# Patient Record
Sex: FEMALE | URBAN - METROPOLITAN AREA
[De-identification: names, ages, dates, MRNs, and addresses within clinical notes are randomized per-mention and may not be internally consistent; named-entity substitution may affect disease eponyms.]

---

## 2017-04-18 ENCOUNTER — TRANSFERRED RECORDS (OUTPATIENT)
Dept: HEALTH INFORMATION MANAGEMENT | Facility: CLINIC | Age: 62
End: 2017-04-18

## 2017-09-27 ENCOUNTER — TRANSFERRED RECORDS (OUTPATIENT)
Dept: HEALTH INFORMATION MANAGEMENT | Facility: CLINIC | Age: 62
End: 2017-09-27

## 2017-09-28 ENCOUNTER — TRANSFERRED RECORDS (OUTPATIENT)
Dept: HEALTH INFORMATION MANAGEMENT | Facility: CLINIC | Age: 62
End: 2017-09-28

## 2017-10-06 ENCOUNTER — TRANSFERRED RECORDS (OUTPATIENT)
Dept: HEALTH INFORMATION MANAGEMENT | Facility: CLINIC | Age: 62
End: 2017-10-06

## 2017-10-13 ENCOUNTER — TRANSFERRED RECORDS (OUTPATIENT)
Dept: HEALTH INFORMATION MANAGEMENT | Facility: CLINIC | Age: 62
End: 2017-10-13

## 2017-10-16 LAB — COPATH REPORT: NORMAL

## 2017-10-16 PROCEDURE — 00000346 ZZHCL STATISTIC REVIEW OUTSIDE SLIDES TC 88321

## 2017-10-17 ENCOUNTER — OFFICE VISIT (OUTPATIENT)
Dept: TRANSPLANT | Facility: CLINIC | Age: 62
End: 2017-10-17
Payer: COMMERCIAL

## 2017-10-17 VITALS
SYSTOLIC BLOOD PRESSURE: 124 MMHG | HEART RATE: 66 BPM | TEMPERATURE: 98.1 F | RESPIRATION RATE: 18 BRPM | WEIGHT: 154.4 LBS | DIASTOLIC BLOOD PRESSURE: 76 MMHG | OXYGEN SATURATION: 97 %

## 2017-10-17 DIAGNOSIS — C81.18 NODULAR SCLEROSIS HODGKIN LYMPHOMA OF LYMPH NODES OF MULTIPLE REGIONS (H): Primary | ICD-10-CM

## 2017-10-17 PROCEDURE — 99213 OFFICE O/P EST LOW 20 MIN: CPT | Mod: ZF

## 2017-10-17 RX ORDER — ALBUTEROL SULFATE 90 UG/1
1-2 AEROSOL, METERED RESPIRATORY (INHALATION)
COMMUNITY
Start: 2017-09-14

## 2017-10-17 RX ORDER — EPINEPHRINE 0.3 MG/.3ML
0.3 INJECTION SUBCUTANEOUS
COMMUNITY
Start: 2017-09-14

## 2017-10-17 RX ORDER — DIPHENOXYLATE HYDROCHLORIDE AND ATROPINE SULFATE 2.5; .025 MG/1; MG/1
1 TABLET ORAL
COMMUNITY
Start: 2013-07-17

## 2017-10-17 RX ORDER — IBUPROFEN 200 MG
400 TABLET ORAL
COMMUNITY

## 2017-10-17 RX ORDER — MULTIVIT-MINERALS/FOLIC ACID 200 MCG
TABLET,CHEWABLE ORAL
COMMUNITY
Start: 2015-12-10

## 2017-10-17 RX ORDER — CHLORAL HYDRATE 500 MG
CAPSULE ORAL
COMMUNITY
Start: 2013-07-17

## 2017-10-17 ASSESSMENT — PAIN SCALES - GENERAL: PAINLEVEL: NO PAIN (0)

## 2017-10-17 NOTE — MR AVS SNAPSHOT
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  After hours call the   hospital at [phone] or   [phone]. Ask for the BMT   fellow on-call            Follow-ups after your visit        Who to contact     If you have questions or need follow up information about today's clinic visit or your schedule please contact [facility] BLOOD AND MARROW TRANSPLANT directly at [phone].  Normal or non-critical lab and imaging results will be communicated to you by [employer], letter or phone within 4 business days after the clinic has received the results. If you do not hear from us within 7 days, please contact the clinic through [employer] or phone. If you have a critical or abnormal lab result, we will notify you by phone as soon as possible.  Submit refill requests through [employer] or call your pharmacy and they will forward the refill request to us. Please allow 3 business days for your refill to be completed.          Additional Information About Your Visit        [employer] Information     [employer] lets you send messages to your doctor, view your test results, renew your prescriptions, schedule appointments and more. To sign up, go to [email or web address] . Click on \"Log in\" on the left side of the screen, which will take you to the Welcome page. Then click on \"Sign up Now\" on the " right side of the page.     You will be asked to enter the access code listed below, as well as some personal information. Please follow the directions to create your username and password.     Your access code is: [email or web address]  Expires: 2018  8:52 AM     Your access code will  in 90 days. If you need help or a new code, please call your [facility] clinic or [phone].        Care EveryWhere ID     This is your Care EveryWhere ID. This could be used by other organizations to access your [facility] medical records  [id number]        Your Vitals Were     Pulse Temperature Respirations Pulse Oximetry          66 98.1  F (36.7  C) (Oral) 18 97%         Blood Pressure from Last 3 Encounters:   10/17/17 124/76    Weight from Last 3 Encounters:   10/17/17 70 kg (154 lb 6.4 oz)              Today, you had the following     No orders found for display       Recent Review Flowsheet Data     There is no flowsheet data to display.               Primary Care Provider Office Phone # Fax #    Rosa Ortiz [phone]       DEANNE CORMIER [address]  [place] [address]        Equal Access to Services     KHANG HOWARD AH: Hadii aad ku hadasho Sochungali, waaxda luqadaha, qaybta kaalmada adeegyada, kenyon bull . So [facility] [phone].    ATENCIÓN: Si habla español, tiene a khan disposición servicios gratuitos de asistencia lingüística. [facility] [phone].    We comply with applicable federal civil rights laws and [place] laws. We do not discriminate on the basis of race, color, national origin, age, disability, sex, sexual orientation, or gender identity.            Thank you!     Thank you for choosing [facility] BLOOD AND MARROW TRANSPLANT  for your care. Our goal is always to provide you with excellent care. Hearing back from our patients is one way we can continue to improve our services. Please take a few minutes to complete the written survey that you may  receive in the mail after your visit with us. Thank you!             Your Updated Medication List - Protect others around you: Learn how to safely use, store and throw away your medicines at www.disposemymeds.org.          This list is accurate as of: 10/17/17 11:59 PM.  Always use your most recent med list.                   Brand Name Dispense Instructions for use Diagnosis    albuterol 108 (90 BASE) MCG/ACT Inhaler    PROAIR HFA/PROVENTIL HFA/VENTOLIN HFA     Inhale 1-2 puffs into the lungs        calcium-vitamin D 600-400 MG-UNIT per tablet    CALTRATE     Take 1 tablet by mouth        [employer] PROBIOTIC MAXIMUM STRENGTH Caps           EPINEPHrine 0.3 MG/0.3ML injection 2-pack    EPIPEN/ADRENACLICK/or ANY BX GENERIC EQUIV     Inject 0.3 mg into the muscle        fish oil-omega-3 fatty acids 1000 MG capsule           ibuprofen 200 MG tablet    ADVIL/MOTRIN     Take 400 mg by mouth        MULTI-VITAMINS Tabs      Take 1 tablet by mouth        PULMICORT FLEXHALER 180 MCG/ACT inhaler   Generic drug:  budesonide      Inhale 180 mcg into the lungs        vitamin D3 1000 UNITS Caps

## 2017-10-17 NOTE — NURSING NOTE
Oncology Rooming Note    October 17, 2017 3:51 PM   Gemma Guan is a 61 year old female who presents for:    Chief Complaint   Patient presents with     RECHECK     Patient with HD here for provider     Initial Vitals: /76  Pulse 66  Temp 98.1  F (36.7  C) (Oral)  Resp 18  Wt 70 kg (154 lb 6.4 oz)  SpO2 97% There is no height or weight on file to calculate BMI. There is no height or weight on file to calculate BSA.  No Pain (0) Comment: Data Unavailable   No LMP recorded.  Allergies reviewed: Yes  Medications reviewed: Yes    Medications: Medication refills not needed today.  Pharmacy name entered into EPIC: Data Unavailable    Clinical concerns: NA NA was NOT notified.    10 minutes for nursing intake (face to face time)     Lisseth Quispe, CMA

## 2017-10-17 NOTE — LETTER
10/17/2017       RE: Gemma Guan  [address] [facility] [address]     Dear Colleague,    Thank you for referring your patient, Gemma Guan, to the [facility] BLOOD AND MARROW TRANSPLANT at [facility]. Please see a copy of my visit note below.    Dear     Mrs.MacLeod Guan was seen today at [facility] for second opinion regarding her newly diagnosed Hodgkin lymphoma.    Her history goes back to feb 2016 when she presented with low-grade fevers and CT repeated with PET scan showed generalized lymphadenopathy in the mediastium, as well as a large subcarinal nod. Biopsies of subcarinal node (inadequate tissue) and cervical node (benign). in Feb. 2016 were unrevealing. Saw thoracic surgeon--question of sarcoid vs lymphoma. Had cervical mediastinoscopy 3/1/16--right paratracheal node: small clonal B-cell population with immunophenotypic features of CLL/small lymphocytic lymphoma (sent the biopsy to [place]). However, this was not felt to be a clear malignant process. Oncology consult 3/18/16 with Dr. Huggins. Imaging was stable on repeat PET-CT 6/15/16, though mediastinal nodes do light up. Some apparently have gotten larger, and some smaller. In repeat PET-CT in 12/16 and again on 6/15/16, though mediastinal nodes seems bigger with abnormal SUV. Some apparently have gotten larger, and some smaller.  Recent left axillary lymph node excision October 6, 2017 demonstrated nodular sclerosis classical Hodgkin`s lymphoma.     PMHx: No history of chemotherapy or radiation therapy. Prior history of a vaginal hysterectomy for uterine fibroids. Previous biopsy proven fibroadenoma of the right breast. Otherwise no history of heart or lung disease.     Today, she reports having B symptoms--drenching night sweats, a low grade fever for several weeks now. She did not loose weight. Otherwise no cough, SOB, chest pain, extremity swelling,  no GI,  or other       Psth report from  axillary excisional lymph nodes biopsy on 10/06 showed:  Diagnosis LYMPH NODE, LEFT AXILLA, EXCISION:   Nodular sclerosis classical Hodgkin lymphoma     [facility] review confirmed the diagnosis of classical HL.       Family History    Medical History Relation Name Comments   Hypertension Brother       Hyperlipidemia Father 87     Stroke Maternal Grandmother       Cancer-colon Mother 85     Hypertension Mother 85     Melanoma Mother 85 x2   Other Mother 85 glaucoma, basal cell cancer   Cancer Paternal Grandmother   leukemia   Cancer-colon Sister   polyp in 40s   Psychiatric illness Sister   bipolar?   Cancer-breast No Family History       Cancer-ovarian No Family History       Diabetes No Family History       Heart Disease No Family History         Relation Name Status Comments   Brother   Alive     Father 87 Alive     Maternal Grandfather        Maternal Grandmother        Mother 85 Alive     Paternal Grandfather        Paternal Grandmother        Sister   Alive     Sister   Alive     Son   Alive     Son   Alive     Social History    Tobacco Use Types Packs/Day Years Used Date   Never Smoker           Smokeless Tobacco: Never Used           Alcohol Use Drinks/Week oz/Week Comments   Yes 0 Standard drinks or equivalent          Labs:  No results found for: WBC, ANEU, HGB, HCT, PLT, NA, POTASSIUM, CHLORIDE, CO2, GLC, BUN, CR, MAG, INR, AST, ALT       CBC WITH AUTO DIFFERENTIAL (2017 11:33 AM)  CBC WITH AUTO DIFFERENTIAL (2017 11:33 AM)   Component Value Ref Range   WHITE BLOOD COUNT  8.5 4.5 - 11.0 thou/cu mm   RED BLOOD COUNT  4.45 4.00 - 5.20 mil/cu mm   HEMOGLOBIN  11.9 (L) 12.0 - 16.0 g/dL   HEMATOCRIT  37.5 33.0 - 51.0 %   MCV  84 80 - 100 fL   MCH  26.7 26.0 - 34.0 pg   MCHC  31.7 (L) 32.0 - 36.0 g/dL   RDW  15.5 11.5 - 15.5 %   PLATELET COUNT  404      COMP METABOLIC PANEL (2017 11:33 AM)  COMP METABOLIC PANEL (2017  11:33 AM)   Component Value Ref Range   SODIUM 141 135 - 145 mmol/L   POTASSIUM 3.9 3.5 - 5.0 mmol/L   CHLORIDE 105 98 - 110 mmol/L   CO2,TOTAL 29 21 - 31 mmol/L   ANION GAP 7 5 - 18    GLUCOSE 95 65 - 100 mg/dL   CALCIUM 9.8 8.5 - 10.5 mg/dL   BUN 15 8 - 25 mg/dL   CREATININE 0.80 0.57 - 1.11 mg/dL   BUN/CREAT RATIO  19 10 - 20    GFR if African American >60 >60 ml/min/1.73m2   GFR if not African American >60 >60 ml/min/1.73m2   ALBUMIN 4.0 3.5 - 5.2 g/dL   PROTEIN,TOTAL 8.1 (H) 6.0 - 8.0 g/dL   GLOBULIN  4.1 (H) 2.0 - 3.7 g/dL   A/G RATIO 1.0 1.0 - 2.0    BILIRUBIN,TOTAL 0.2 0.2 - 1.2 mg/dL   ALK PHOSPHATASE 182 (H) 50 - 136 IU/L   ALT (SGPT) 19 8 - 45 IU/L   AST (SGOT) 19 2 - 40 IU/L       Echo:  Final Impressions:    1. Normal LV size, normal wall thickness, normal global systolic function with an estimated EF of 65 - 70%.    2. Right ventricular cavity size is normal, global systolic RV function is normal.    3. No significant functional valve disease detected.       PET/CT October 13, 2017  Impression:    1. In general there has been an increase in the size, number, and activity within multiple lymph nodes of the right side of the face, as well as within the neck, chest, abdomen, and pelvis.    2. New multi focal activity within the spleen compatible with splenic involvement.  For example on image 126 the SUV max is 4.0.  Persistent focal activity in the splenic hilus with an SUV max of 3.1 previously 3.7.    3. Scattered areas of subtle increased activity and sclerosis in the skeleton as described likely related to lymphoma.         4. Global longitudinal strain was not able to be accurately assessed because of suboptimal image quality   Assessment and Plan:    Patient is a 62 yo previously healthy female with several months history of B-symptoms, and now diagnosed classical Hodgkin lymphoma. She has stage IIIB. Bone marrow biopsy is not longer required for HL staging given very good sensitivity of PET. In  this patient however, it may be useful given uncertainty about diagnosis of low-grade lymphoma or CLL. Suggest to send marrow sample for flow and FISH analysis.     She is in good general health and good candidate for systemic standard chemotherapy. We currently do not have any trial for front-line therapy and recently completed ABVD/ vs AVD+BV trial has not been reported yet. The recommendation is to received up 6 cycles of ABVD regimen. I recommend to restage after 2 cycles - PET2 and if she is in complete remission, it is safe and advisable to omit Bleomycin from last 4 cycles. This is based on prospective study reported recently.     If patient has poorly responsive disease, we would have to see her back here for other treatments and transplant consideration.     I also encourage  to receive a influenza vaccine and pneumonia vaccine.  It was a pleasure to meet her and feel free to contact me with other questions.     Sincerely,     Rocio Stevens MD  Associate Professor of Medicine  [phone]

## 2017-10-22 NOTE — PROGRESS NOTES
Dear     Mrs.MacLeod Guan was seen today at [facility] for second opinion regarding her newly diagnosed Hodgkin lymphoma.    Her history goes back to feb 2016 when she presented with low-grade fevers and CT repeated with PET scan showed generalized lymphadenopathy in the mediastium, as well as a large subcarinal nod. Biopsies of subcarinal node (inadequate tissue) and cervical node (benign). in Feb. 2016 were unrevealing. Saw thoracic surgeon--question of sarcoid vs lymphoma. Had cervical mediastinoscopy 3/1/16--right paratracheal node: small clonal B-cell population with immunophenotypic features of CLL/small lymphocytic lymphoma (sent the biopsy to [place]). However, this was not felt to be a clear malignant process. Oncology consult 3/18/16 with Dr. Huggins. Imaging was stable on repeat PET-CT 6/15/16, though mediastinal nodes do light up. Some apparently have gotten larger, and some smaller. In repeat PET-CT in 12/16 and again on 6/15/16, though mediastinal nodes seems bigger with abnormal SUV. Some apparently have gotten larger, and some smaller.  Recent left axillary lymph node excision October 6, 2017 demonstrated nodular sclerosis classical Hodgkin`s lymphoma.     PMHx: No history of chemotherapy or radiation therapy. Prior history of a vaginal hysterectomy for uterine fibroids. Previous biopsy proven fibroadenoma of the right breast. Otherwise no history of heart or lung disease.     Today, she reports having B symptoms--drenching night sweats, a low grade fever for several weeks now. She did not loose weight. Otherwise no cough, SOB, chest pain, extremity swelling, no GI,  or other       Psth report from [facility] axillary excisional lymph nodes biopsy on 10/06 showed:  Diagnosis LYMPH NODE, LEFT AXILLA, EXCISION:   Nodular sclerosis classical Hodgkin lymphoma     [facility] review confirmed the diagnosis of classical HL.       Family History    Medical History Relation Name Comments    Hypertension Brother       Hyperlipidemia Father 87     Stroke Maternal Grandmother       Cancer-colon Mother 85     Hypertension Mother 85     Melanoma Mother 85 x2   Other Mother 85 glaucoma, basal cell cancer   Cancer Paternal Grandmother   leukemia   Cancer-colon Sister   polyp in 40s   Psychiatric illness Sister   bipolar?   Cancer-breast No Family History       Cancer-ovarian No Family History       Diabetes No Family History       Heart Disease No Family History         Relation Name Status Comments   Brother   Alive     Father 87 Alive     Maternal Grandfather        Maternal Grandmother        Mother 85 Alive     Paternal Grandfather        Paternal Grandmother        Sister   Alive     Sister   Alive     Son   Alive     Son   Alive     Social History    Tobacco Use Types Packs/Day Years Used Date   Never Smoker           Smokeless Tobacco: Never Used           Alcohol Use Drinks/Week oz/Week Comments   Yes 0 Standard drinks or equivalent          Labs:  No results found for: WBC, ANEU, HGB, HCT, PLT, NA, POTASSIUM, CHLORIDE, CO2, GLC, BUN, CR, MAG, INR, AST, ALT       CBC WITH AUTO DIFFERENTIAL (2017 11:33 AM)  CBC WITH AUTO DIFFERENTIAL (2017 11:33 AM)   Component Value Ref Range   WHITE BLOOD COUNT  8.5 4.5 - 11.0 thou/cu mm   RED BLOOD COUNT  4.45 4.00 - 5.20 mil/cu mm   HEMOGLOBIN  11.9 (L) 12.0 - 16.0 g/dL   HEMATOCRIT  37.5 33.0 - 51.0 %   MCV  84 80 - 100 fL   MCH  26.7 26.0 - 34.0 pg   MCHC  31.7 (L) 32.0 - 36.0 g/dL   RDW  15.5 11.5 - 15.5 %   PLATELET COUNT  404      COMP METABOLIC PANEL (2017 11:33 AM)  COMP METABOLIC PANEL (2017 11:33 AM)   Component Value Ref Range   SODIUM 141 135 - 145 mmol/L   POTASSIUM 3.9 3.5 - 5.0 mmol/L   CHLORIDE 105 98 - 110 mmol/L   CO2,TOTAL 29 21 - 31 mmol/L   ANION GAP 7 5 - 18    GLUCOSE 95 65 - 100 mg/dL   CALCIUM 9.8 8.5 - 10.5 mg/dL   BUN 15 8 - 25 mg/dL   CREATININE 0.80 0.57 - 1.11 mg/dL   BUN/CREAT  RATIO  19 10 - 20    GFR if African American >60 >60 ml/min/1.73m2   GFR if not African American >60 >60 ml/min/1.73m2   ALBUMIN 4.0 3.5 - 5.2 g/dL   PROTEIN,TOTAL 8.1 (H) 6.0 - 8.0 g/dL   GLOBULIN  4.1 (H) 2.0 - 3.7 g/dL   A/G RATIO 1.0 1.0 - 2.0    BILIRUBIN,TOTAL 0.2 0.2 - 1.2 mg/dL   ALK PHOSPHATASE 182 (H) 50 - 136 IU/L   ALT (SGPT) 19 8 - 45 IU/L   AST (SGOT) 19 2 - 40 IU/L       Echo:  Final Impressions:    1. Normal LV size, normal wall thickness, normal global systolic function with an estimated EF of 65 - 70%.    2. Right ventricular cavity size is normal, global systolic RV function is normal.    3. No significant functional valve disease detected.       PET/CT October 13, 2017  Impression:    1. In general there has been an increase in the size, number, and activity within multiple lymph nodes of the right side of the face, as well as within the neck, chest, abdomen, and pelvis.    2. New multi focal activity within the spleen compatible with splenic involvement.  For example on image 126 the SUV max is 4.0.  Persistent focal activity in the splenic hilus with an SUV max of 3.1 previously 3.7.    3. Scattered areas of subtle increased activity and sclerosis in the skeleton as described likely related to lymphoma.         4. Global longitudinal strain was not able to be accurately assessed because of suboptimal image quality   Assessment and Plan:    Patient is a 62 yo previously healthy female with several months history of B-symptoms, and now diagnosed classical Hodgkin lymphoma. She has stage IIIB. Bone marrow biopsy is not longer required for HL staging given very good sensitivity of PET. In this patient however, it may be useful given uncertainty about diagnosis of low-grade lymphoma or CLL. Suggest to send marrow sample for flow and FISH analysis.     She is in good general health and good candidate for systemic standard chemotherapy. We currently do not have any trial for front-line therapy and  recently completed ABVD/ vs AVD+BV trial has not been reported yet. The recommendation is to received up 6 cycles of ABVD regimen. I recommend to restage after 2 cycles - PET2 and if she is in complete remission, it is safe and advisable to omit Bleomycin from last 4 cycles. This is based on prospective study reported recently.     If patient has poorly responsive disease, we would have to see her back here for other treatments and transplant consideration.     I also encourage  to receive a influenza vaccine and pneumonia vaccine.  It was a pleasure to meet her and feel free to contact me with other questions.     Sincerely,     Rocio Stevens MD  Associate Professor of Medicine  [phone]